# Patient Record
Sex: FEMALE | Race: WHITE | NOT HISPANIC OR LATINO | URBAN - METROPOLITAN AREA
[De-identification: names, ages, dates, MRNs, and addresses within clinical notes are randomized per-mention and may not be internally consistent; named-entity substitution may affect disease eponyms.]

---

## 2021-03-03 DIAGNOSIS — Z23 NEED FOR VACCINATION: ICD-10-CM

## 2022-12-20 ENCOUNTER — TELEPHONE (OUTPATIENT)
Dept: HEALTH INFORMATION MANAGEMENT | Facility: OTHER | Age: 67
End: 2022-12-20

## 2023-01-12 ENCOUNTER — TELEPHONE (OUTPATIENT)
Dept: HEALTH INFORMATION MANAGEMENT | Facility: OTHER | Age: 68
End: 2023-01-12

## 2023-09-27 ENCOUNTER — TELEPHONE (OUTPATIENT)
Dept: HEALTH INFORMATION MANAGEMENT | Facility: OTHER | Age: 68
End: 2023-09-27

## 2024-11-13 ENCOUNTER — APPOINTMENT (OUTPATIENT)
Dept: FAMILY PLANNING/WOMEN'S HEALTH CLINIC | Facility: PHYSICIAN GROUP | Age: 69
End: 2024-11-13
Payer: MEDICARE

## 2024-12-19 ENCOUNTER — TELEPHONE (OUTPATIENT)
Dept: HEALTH INFORMATION MANAGEMENT | Facility: OTHER | Age: 69
End: 2024-12-19
Payer: MEDICARE

## 2025-01-16 ENCOUNTER — APPOINTMENT (OUTPATIENT)
Dept: MEDICAL GROUP | Facility: IMAGING CENTER | Age: 70
End: 2025-01-16
Payer: MEDICARE

## 2025-01-16 VITALS
OXYGEN SATURATION: 90 % | BODY MASS INDEX: 22.38 KG/M2 | HEART RATE: 69 BPM | HEIGHT: 62 IN | WEIGHT: 121.6 LBS | DIASTOLIC BLOOD PRESSURE: 60 MMHG | SYSTOLIC BLOOD PRESSURE: 122 MMHG | RESPIRATION RATE: 16 BRPM | TEMPERATURE: 96.9 F

## 2025-01-16 DIAGNOSIS — Z86.79 HISTORY OF MITRAL VALVE PROLAPSE: ICD-10-CM

## 2025-01-16 DIAGNOSIS — Z76.89 ENCOUNTER TO ESTABLISH CARE: ICD-10-CM

## 2025-01-16 DIAGNOSIS — H02.9 EYELID PROBLEM: ICD-10-CM

## 2025-01-16 DIAGNOSIS — Z13.1 DIABETES MELLITUS SCREENING: ICD-10-CM

## 2025-01-16 DIAGNOSIS — E78.5 DYSLIPIDEMIA: ICD-10-CM

## 2025-01-16 DIAGNOSIS — Z12.31 ENCOUNTER FOR SCREENING MAMMOGRAM FOR BREAST CANCER: ICD-10-CM

## 2025-01-16 DIAGNOSIS — E55.9 VITAMIN D INSUFFICIENCY: ICD-10-CM

## 2025-01-16 DIAGNOSIS — Z78.0 MENOPAUSE: ICD-10-CM

## 2025-01-16 DIAGNOSIS — Z12.83 SKIN CANCER SCREENING: ICD-10-CM

## 2025-01-16 DIAGNOSIS — R76.8 ANA POSITIVE: ICD-10-CM

## 2025-01-16 DIAGNOSIS — Z11.59 NEED FOR HEPATITIS C SCREENING TEST: ICD-10-CM

## 2025-01-16 PROCEDURE — 3074F SYST BP LT 130 MM HG: CPT | Performed by: INTERNAL MEDICINE

## 2025-01-16 PROCEDURE — 1126F AMNT PAIN NOTED NONE PRSNT: CPT | Performed by: INTERNAL MEDICINE

## 2025-01-16 PROCEDURE — 99204 OFFICE O/P NEW MOD 45 MIN: CPT | Performed by: INTERNAL MEDICINE

## 2025-01-16 PROCEDURE — 3078F DIAST BP <80 MM HG: CPT | Performed by: INTERNAL MEDICINE

## 2025-01-16 ASSESSMENT — PATIENT HEALTH QUESTIONNAIRE - PHQ9: CLINICAL INTERPRETATION OF PHQ2 SCORE: 0

## 2025-01-16 ASSESSMENT — PAIN SCALES - GENERAL: PAINLEVEL_OUTOF10: NO PAIN

## 2025-01-16 NOTE — PROGRESS NOTES
New Patient to Establish    Reason to establish: New patient to establish    Martha Antonio is a 69 y.o. female who presents today with the following:    CC:   Chief Complaint   Patient presents with    [facility] Care     Comprehensive test       HPI:     Verbal consent was acquired by the patient to use [employer] ambient listening note generation during this visit Yes       History of Present Illness  The patient is a 69-year-old female presenting for establishing care, evaluation of eyelid erythema, elevated cholesterol, and health maintenance.    Eyelid Erythema  She contracted COVID-19 three years ago, characterized by dizziness upon awakening. Since then, she has had persistent eyelid redness despite abstaining from cosmetics for a month. No associated pain or vision changes. During COVID-19, she had string-like material in her eyes upon waking, causing occasional difficulty in opening her eyes. She has a history of poor vision and consults an ophthalmologist at [facility]. No history of allergies or other eye issues. No dry eyes or dry mouth.  - Onset: Persistent since myrna COVID-19 three years ago.  - Location: Eyelids.  - Duration: intermittent for three years.  - Character: Redness without pain or vision changes; string-like material in eyes during COVID-19.  - Alleviating/Aggravating Factors: Abstained from cosmetics for a month without improvement.  - Timing: Persistent redness; occasional difficulty in opening eyes during COVID-19.    Fair Skin  She has fair skin and does not regularly see a dermatologist as she has never had skin issues.    Elevated Cholesterol  Her cholesterol levels were significantly elevated four years ago. She maintains a diet rich in fruits, vegetables, and cheese but frequently consumes junk food. No supplements or joint pain.  - Onset: Elevated cholesterol levels noted four years ago.  - Character: Elevated cholesterol levels.  - Alleviating/Aggravating Factors: Diet rich  "in fruits, vegetables, and cheese; frequent consumption of junk food.    Health Maintenance  She has a history of mitral valve prolapse and was advised against the pneumonia vaccine by her cardiologist. She has received the COVID-19 vaccine. Pap smear results were normal. Completed Cologuard test. Interested in a bone density test due to her physically demanding job involving heavy lifting, though she has no pain. No chest pain or vaginal bleeding.    ALLERGIES  - No known allergies    IMMUNIZATIONS  - COVID-19 vaccine received    Problem   History of Mitral Valve Prolapse    Patient reported hx of Mitral valve prolapse  She denies chest pain, SOB, palpitations     Eyelid Problem    Intermittent conjunctival redness and eyelid problems     Dyslipidemia   Vaibhav Positive    Hx of positive VAIBHAV in 2005  Denies arthralgia, myagia  Recheck VAIBHAV           No current outpatient medications on file.    Allergies, past medical history, past surgical history, medications, family history, social history reviewed and updated.    ROS Please see HPI    Physical Exam  /60 (BP Location: Left arm, Patient Position: Sitting, BP Cuff Size: Adult)   Pulse 69   Temp 36.1 °C (96.9 °F) (Temporal)   Resp 16   Ht 1.575 m (5' 2\")   Wt 55.2 kg (121 lb 9.6 oz)   SpO2 90%   BMI 22.24 kg/m²   Physical Exam  Constitutional:       Appearance: Normal appearance.   HENT:      Head: Normocephalic and atraumatic.      Right Ear: External ear normal.      Left Ear: External ear normal.      Nose: Nose normal.      Mouth/Throat:      Pharynx: Oropharynx is clear.   Cardiovascular:      Rate and Rhythm: Normal rate and regular rhythm.      Pulses: Normal pulses.   Pulmonary:      Effort: Pulmonary effort is normal.      Breath sounds: Normal breath sounds.   Abdominal:      General: Bowel sounds are normal.      Palpations: Abdomen is soft.      Tenderness: There is no abdominal tenderness.   Musculoskeletal:      Cervical back: Neck supple.     "  Right lower leg: No edema.      Left lower leg: No edema.   Skin:     General: Skin is warm and dry.   Neurological:      Mental Status: She is alert. Mental status is at baseline.   Psychiatric:         Mood and Affect: Mood normal.         Behavior: Behavior normal.         Thought Content: Thought content normal.         Judgment: Judgment normal.         Assessment and Plan      Assessment & Plan      1. Encounter to establish care    2. Eyelid problem  - Referral to Ophthalmology  - CBC WITH DIFFERENTIAL; Future  Not infected     3. Dyslipidemia  - Lipid Profile; Future  - TSH WITH REFLEX TO FT4; Future  Healthful lifestyle measures    4. VAIBHAV positive  - CBC WITH DIFFERENTIAL; Future  - Comp Metabolic Panel; Future  - TSH WITH REFLEX TO FT4; Future  - URINALYSIS,CULTURE IF INDICATED; Future  - VAIBHAV ANTIBODY WITH REFLEX; Future    5. Skin cancer screening  - Referral to Dermatology    6. Diabetes mellitus screening  - Comp Metabolic Panel; Future    7. Need for hepatitis C screening test  - HEP C VIRUS ANTIBODY; Future    8. Vitamin D insufficiency  - VITAMIN D,25 HYDROXY (DEFICIENCY); Future  Recommend vitamin D3 6920-2861 international units daily    9. Menopause  - DS-BONE DENSITY STUDY (DEXA); Future    10. Encounter for screening mammogram for breast cancer  - MA-SCREENING MAMMO BILAT W/TOMOSYNTHESIS W/CAD; Future    11. History of mitral valve prolapse  Patient reported hx of Mitral valve prolapse  She denies chest pain, SOB, palpitations    Health maintenance - History of mitral valve prolapse, advised against pneumonia vaccine.  - Advised to take vitamin D3 supplements (0922-3707 units daily)  - Advised to receive Tdap, PCV20, Shingrix, influenza, and RSV vaccines at local pharmacy  - Advised annual mammogram and bone density test after age 65  - Comprehensive blood tests (cholesterol, sugar, liver, kidney function) and urine test ordered  - Orders for mammogram and bone density test provided  - VAIBHAV  levels to be rechecked    Follow-up:Return in about 6 weeks (around 2/27/2025).    This note was created using voice recognition software. There may be unintended errors in spelling, grammar or content.

## 2025-01-21 ENCOUNTER — HOSPITAL ENCOUNTER (OUTPATIENT)
Dept: LAB | Facility: MEDICAL CENTER | Age: 70
End: 2025-01-21
Attending: INTERNAL MEDICINE
Payer: MEDICARE

## 2025-01-21 DIAGNOSIS — Z13.1 DIABETES MELLITUS SCREENING: ICD-10-CM

## 2025-01-21 DIAGNOSIS — E55.9 VITAMIN D INSUFFICIENCY: ICD-10-CM

## 2025-01-21 DIAGNOSIS — H02.9 EYELID PROBLEM: ICD-10-CM

## 2025-01-21 DIAGNOSIS — E78.5 DYSLIPIDEMIA: ICD-10-CM

## 2025-01-21 DIAGNOSIS — R76.8 ANA POSITIVE: ICD-10-CM

## 2025-01-21 DIAGNOSIS — Z11.59 NEED FOR HEPATITIS C SCREENING TEST: ICD-10-CM

## 2025-01-21 LAB
APPEARANCE UR: CLEAR
BACTERIA #/AREA URNS HPF: NORMAL /HPF
BASOPHILS # BLD AUTO: 0.7 % (ref 0–1.8)
BASOPHILS # BLD: 0.05 K/UL (ref 0–0.12)
BILIRUB UR QL STRIP.AUTO: NEGATIVE
CASTS URNS QL MICRO: NORMAL /LPF (ref 0–2)
COLOR UR: YELLOW
EOSINOPHIL # BLD AUTO: 0.08 K/UL (ref 0–0.51)
EOSINOPHIL NFR BLD: 1.1 % (ref 0–6.9)
EPITHELIAL CELLS 1715: NORMAL /HPF (ref 0–5)
ERYTHROCYTE [DISTWIDTH] IN BLOOD BY AUTOMATED COUNT: 46 FL (ref 35.9–50)
GLUCOSE UR STRIP.AUTO-MCNC: NEGATIVE MG/DL
HCT VFR BLD AUTO: 41.8 % (ref 37–47)
HCV AB SER QL: NORMAL
HGB BLD-MCNC: 14.1 G/DL (ref 12–16)
IMM GRANULOCYTES # BLD AUTO: 0.02 K/UL (ref 0–0.11)
IMM GRANULOCYTES NFR BLD AUTO: 0.3 % (ref 0–0.9)
KETONES UR STRIP.AUTO-MCNC: NEGATIVE MG/DL
LEUKOCYTE ESTERASE UR QL STRIP.AUTO: ABNORMAL
LYMPHOCYTES # BLD AUTO: 1.24 K/UL (ref 1–4.8)
LYMPHOCYTES NFR BLD: 17 % (ref 22–41)
MCH RBC QN AUTO: 30.7 PG (ref 27–33)
MCHC RBC AUTO-ENTMCNC: 33.7 G/DL (ref 32.2–35.5)
MCV RBC AUTO: 90.9 FL (ref 81.4–97.8)
MICRO URNS: ABNORMAL
MONOCYTES # BLD AUTO: 0.57 K/UL (ref 0–0.85)
MONOCYTES NFR BLD AUTO: 7.8 % (ref 0–13.4)
NEUTROPHILS # BLD AUTO: 5.33 K/UL (ref 1.82–7.42)
NEUTROPHILS NFR BLD: 73.1 % (ref 44–72)
NITRITE UR QL STRIP.AUTO: NEGATIVE
NRBC # BLD AUTO: 0 K/UL
NRBC BLD-RTO: 0 /100 WBC (ref 0–0.2)
PH UR STRIP.AUTO: 6 [PH] (ref 5–8)
PLATELET # BLD AUTO: 273 K/UL (ref 164–446)
PMV BLD AUTO: 10.5 FL (ref 9–12.9)
PROT UR QL STRIP: NEGATIVE MG/DL
RBC # BLD AUTO: 4.6 M/UL (ref 4.2–5.4)
RBC # URNS HPF: NORMAL /HPF (ref 0–2)
RBC UR QL AUTO: NEGATIVE
SP GR UR STRIP.AUTO: 1.02
UROBILINOGEN UR STRIP.AUTO-MCNC: 1 EU/DL
WBC # BLD AUTO: 7.3 K/UL (ref 4.8–10.8)
WBC #/AREA URNS HPF: NORMAL /HPF

## 2025-01-21 PROCEDURE — 85025 COMPLETE CBC W/AUTO DIFF WBC: CPT

## 2025-01-21 PROCEDURE — 84439 ASSAY OF FREE THYROXINE: CPT

## 2025-01-21 PROCEDURE — 84443 ASSAY THYROID STIM HORMONE: CPT

## 2025-01-21 PROCEDURE — 80053 COMPREHEN METABOLIC PANEL: CPT

## 2025-01-21 PROCEDURE — 36415 COLL VENOUS BLD VENIPUNCTURE: CPT

## 2025-01-21 PROCEDURE — 81001 URINALYSIS AUTO W/SCOPE: CPT

## 2025-01-21 PROCEDURE — 86256 FLUORESCENT ANTIBODY TITER: CPT

## 2025-01-21 PROCEDURE — 82306 VITAMIN D 25 HYDROXY: CPT

## 2025-01-21 PROCEDURE — 86803 HEPATITIS C AB TEST: CPT

## 2025-01-21 PROCEDURE — 80061 LIPID PANEL: CPT

## 2025-01-21 PROCEDURE — 86039 ANTINUCLEAR ANTIBODIES (ANA): CPT

## 2025-01-21 PROCEDURE — 86038 ANTINUCLEAR ANTIBODIES: CPT

## 2025-01-21 PROCEDURE — 86235 NUCLEAR ANTIGEN ANTIBODY: CPT

## 2025-01-21 PROCEDURE — 86225 DNA ANTIBODY NATIVE: CPT

## 2025-01-22 LAB
25(OH)D3 SERPL-MCNC: 23 NG/ML (ref 30–100)
ALBUMIN SERPL BCP-MCNC: 5.1 G/DL (ref 3.2–4.9)
ALBUMIN/GLOB SERPL: 2 G/DL
ALP SERPL-CCNC: 74 U/L (ref 30–99)
ALT SERPL-CCNC: 17 U/L (ref 2–50)
ANION GAP SERPL CALC-SCNC: 13 MMOL/L (ref 7–16)
AST SERPL-CCNC: 21 U/L (ref 12–45)
BILIRUB SERPL-MCNC: 0.4 MG/DL (ref 0.1–1.5)
BUN SERPL-MCNC: 15 MG/DL (ref 8–22)
CALCIUM ALBUM COR SERPL-MCNC: 8.7 MG/DL (ref 8.5–10.5)
CALCIUM SERPL-MCNC: 9.6 MG/DL (ref 8.5–10.5)
CHLORIDE SERPL-SCNC: 101 MMOL/L (ref 96–112)
CHOLEST SERPL-MCNC: 287 MG/DL (ref 100–199)
CO2 SERPL-SCNC: 26 MMOL/L (ref 20–33)
CREAT SERPL-MCNC: 0.63 MG/DL (ref 0.5–1.4)
GFR SERPLBLD CREATININE-BSD FMLA CKD-EPI: 96 ML/MIN/1.73 M 2
GLOBULIN SER CALC-MCNC: 2.5 G/DL (ref 1.9–3.5)
GLUCOSE SERPL-MCNC: 81 MG/DL (ref 65–99)
HDLC SERPL-MCNC: 51 MG/DL
LDLC SERPL CALC-MCNC: 206 MG/DL
POTASSIUM SERPL-SCNC: 4.4 MMOL/L (ref 3.6–5.5)
PROT SERPL-MCNC: 7.6 G/DL (ref 6–8.2)
SODIUM SERPL-SCNC: 140 MMOL/L (ref 135–145)
T4 FREE SERPL-MCNC: 1.08 NG/DL (ref 0.93–1.7)
TRIGL SERPL-MCNC: 149 MG/DL (ref 0–149)
TSH SERPL DL<=0.005 MIU/L-ACNC: 5.36 UIU/ML (ref 0.38–5.33)

## 2025-01-23 LAB — NUCLEAR IGG SER QL IA: DETECTED

## 2025-01-24 LAB
ANA PAT SER IF-IMP: ABNORMAL
ANA PAT SER IF-IMP: ABNORMAL
NUCLEAR IGG SER QL IF: DETECTED
NUCLEAR IGG TITR SER IF: ABNORMAL {TITER}

## 2025-01-25 LAB — U1 SNRNP IGG SER QL: 4 UNITS (ref 0–19)

## 2025-01-26 LAB
DSDNA AB TITR SER CLIF: NORMAL {TITER}
ENA JO1 AB TITR SER: 0 AU/ML (ref 0–40)
ENA SCL70 IGG SER QL: 4 AU/ML (ref 0–40)
ENA SM IGG SER-ACNC: 8 AU/ML (ref 0–40)
ENA SS-A 60KD AB SER-ACNC: 0 AU/ML (ref 0–40)
ENA SS-A IGG SER QL: 2 AU/ML (ref 0–40)
ENA SS-B IGG SER IA-ACNC: 0 AU/ML (ref 0–40)

## 2025-01-27 LAB — DSDNA IGG TITR SER CLIF: NORMAL {TITER}

## 2025-01-28 ENCOUNTER — HOSPITAL ENCOUNTER (OUTPATIENT)
Dept: RADIOLOGY | Facility: MEDICAL CENTER | Age: 70
End: 2025-01-28
Attending: INTERNAL MEDICINE
Payer: MEDICARE

## 2025-01-28 DIAGNOSIS — Z12.31 ENCOUNTER FOR SCREENING MAMMOGRAM FOR BREAST CANCER: ICD-10-CM

## 2025-01-28 PROCEDURE — 77067 SCR MAMMO BI INCL CAD: CPT

## 2025-02-19 ENCOUNTER — HOSPITAL ENCOUNTER (OUTPATIENT)
Dept: RADIOLOGY | Facility: MEDICAL CENTER | Age: 70
End: 2025-02-19
Attending: INTERNAL MEDICINE
Payer: MEDICARE

## 2025-02-19 ENCOUNTER — RESULTS FOLLOW-UP (OUTPATIENT)
Dept: MEDICAL GROUP | Facility: IMAGING CENTER | Age: 70
End: 2025-02-19

## 2025-02-19 DIAGNOSIS — R92.8 ABNORMAL MAMMOGRAM: ICD-10-CM

## 2025-02-19 PROCEDURE — 76642 ULTRASOUND BREAST LIMITED: CPT | Mod: LT,RT

## 2025-02-25 ENCOUNTER — OFFICE VISIT (OUTPATIENT)
Dept: MEDICAL GROUP | Facility: IMAGING CENTER | Age: 70
End: 2025-02-25
Payer: MEDICARE

## 2025-02-25 VITALS
OXYGEN SATURATION: 100 % | TEMPERATURE: 96.9 F | HEIGHT: 62 IN | SYSTOLIC BLOOD PRESSURE: 120 MMHG | WEIGHT: 123.8 LBS | HEART RATE: 74 BPM | DIASTOLIC BLOOD PRESSURE: 78 MMHG | RESPIRATION RATE: 16 BRPM | BODY MASS INDEX: 22.78 KG/M2

## 2025-02-25 DIAGNOSIS — R76.8 ANTICENTROMERE ANTIBODIES PRESENT: ICD-10-CM

## 2025-02-25 DIAGNOSIS — E78.5 DYSLIPIDEMIA: ICD-10-CM

## 2025-02-25 DIAGNOSIS — E03.8 SUBCLINICAL HYPOTHYROIDISM: ICD-10-CM

## 2025-02-25 PROCEDURE — 3078F DIAST BP <80 MM HG: CPT | Performed by: INTERNAL MEDICINE

## 2025-02-25 PROCEDURE — 3074F SYST BP LT 130 MM HG: CPT | Performed by: INTERNAL MEDICINE

## 2025-02-25 PROCEDURE — 99214 OFFICE O/P EST MOD 30 MIN: CPT | Performed by: INTERNAL MEDICINE

## 2025-02-25 RX ORDER — ROSUVASTATIN CALCIUM 5 MG/1
5 TABLET, COATED ORAL EVERY EVENING
Qty: 100 TABLET | Refills: 3 | Status: SHIPPED | OUTPATIENT
Start: 2025-02-25 | End: 2026-04-01

## 2025-02-25 ASSESSMENT — FIBROSIS 4 INDEX: FIB4 SCORE: 1.29

## 2025-02-25 NOTE — ASSESSMENT & PLAN NOTE
No signs or symptoms of scleroderma, sclerodactyly, denies dysphagia, no significant telangiectasia    Finger change color in cold environment which she is able to compensate with keeping hands and fingers warm    We discussed about rheumatology E consult. She would like to defer and monitor for now.

## 2025-02-25 NOTE — PROGRESS NOTES
Established Patient    Martha Antonio is a 69 y.o. female who presents today with the following:    CC:   Chief Complaint   Patient presents with    Follow-Up     Recent imaging and labs       HPI:     Verbal consent was acquired by the patient to use [employer] ambient listening note generation during this visit Yes     History of Present Illness  The patient presents for evaluation of hypercholesterolemia, vitamin D deficiency, and an autoimmune disorder.    Hypercholesterolemia  She attributes her elevated cholesterol to a diet high in junk food and prefers dietary modifications over medication unless necessary. She has gained 20 pounds since senior care despite an active lifestyle. She has a history of hypercholesterolemia since youth and consumes alcohol infrequently. No history of myalgia.  - Onset: Since youth.  - Character: Elevated cholesterol.  - Alleviating/Aggravating Factors: Prefers dietary modifications over medication; diet high in junk food.  - Severity: Gained 20 pounds since senior care.    Vitamin D Deficiency  Currently on 2000 IU vitamin D supplements.    Autoimmune Disorder  Positive Anticentromere Ab No dysphagia or calcinosis. Advised to keep hands warm and wears gloves during morning work.    No signs or symptoms of scleroderma, sclerodactyly, denies dysphagia, no significant telangiectasia    Finger change color in cold environment which she is able to compensate with keeping hands and fingers warm    We discussed about rheumatology E consult. She would like to defer and monitor for now.    Additional Information  Colonoscopy was normal. Mammogram required follow-up due to lack of previous records, but sonogram was normal.    SOCIAL HISTORY  - Non-smoker  - Drinks alcohol infrequently, about five times a year    FAMILY HISTORY  - Mother  following a car accident    MEDICATIONS  - Vitamin D: 2000 IU daily        Problem   Anticentromere Antibodies Present    No signs or symptoms of  "scleroderma, sclerodactyly, denies dysphagia, no significant telangiectasia    Finger change color in cold environment which she is able to compensate with keeping hands and fingers warm    We discussed about rheumatology E consult. She would like to defer and monitor for now.          Current Outpatient Medications   Medication Sig    rosuvastatin (CRESTOR) 5 MG Tab Take 1 Tablet by mouth every evening.     Allergies   Allergen Reactions    Azithromycin        Allergies, past medical history, past surgical history, medications, family history, social history reviewed and updated.    ROS Please see HPI    Physical Exam  Vitals: /78 (BP Location: Left arm, Patient Position: Sitting, BP Cuff Size: Adult)   Pulse 74   Temp 36.1 °C (96.9 °F) (Temporal)   Resp 16   Ht 1.575 m (5' 2\")   Wt 56.2 kg (123 lb 12.8 oz)   SpO2 100%   BMI 22.64 kg/m²   Physical Exam  Constitutional:       Appearance: Normal appearance.   HENT:      Head: Normocephalic and atraumatic.      Right Ear: External ear normal.      Left Ear: External ear normal.      Nose: Nose normal.      Mouth/Throat:      Pharynx: Oropharynx is clear.   Cardiovascular:      Rate and Rhythm: Normal rate and regular rhythm.      Pulses: Normal pulses.   Pulmonary:      Effort: Pulmonary effort is normal.      Breath sounds: Normal breath sounds.   Abdominal:      General: Bowel sounds are normal.      Palpations: Abdomen is soft.      Tenderness: There is no abdominal tenderness.   Musculoskeletal:      Cervical back: Neck supple.      Right lower leg: No edema.      Left lower leg: No edema.   Skin:     General: Skin is warm and dry.   Neurological:      Mental Status: She is alert. Mental status is at baseline.   Psychiatric:         Mood and Affect: Mood normal.         Behavior: Behavior normal.         Thought Content: Thought content normal.         Judgment: Judgment normal.         Labs (1/21/25) were reviewed and discussed with patients.  All " "questions were answered.      Assessment and Plan    Assessment & Plan      1. Dyslipidemia  - Lipid Profile; Future  - Lipoprotein (a); Future  - Advised dietary changes, including increasing omega-3 rich foods such as salmon and walnuts, and avoiding high-fat foods like shepard, butter, and pastries.  - Recommended regular physical activity and staying active.  - Prescribed rosuvastatin with instructions to discontinue if myalgia occurs.  - Suggested a calcium cardiac scoring CT scan to assess coronary artery plaque, but patient declined due to cost.    - rosuvastatin (CRESTOR) 5 MG Tab; Take 1 Tablet by mouth every evening.  Dispense: 100 Tablet; Refill: 3    Benefit and risks of statin discussed with patient include side effect such as muscle toxicity, patient was told if having side effects from medication, to stop the medication and notify healthcare provider.  Patient is agreeable with healthful diet, exercise and initiation of statin.      2. Subclinical hypothyroidism  - Comp Metabolic Panel; Future  - TSH WITH REFLEX TO FT4; Future  - THYROID PEROXIDASE  (TPO) AB; Future  Monitor    3. Anticentromere antibodies present  No signs or symptoms of scleroderma, sclerodactyly, denies dysphagia, no significant telangiectasia  Finger change color in cold environment which she is able to compensate with keeping hands and fingers warm  We discussed about rheumatology E consult. She would like to defer and monitor for now.    Lifestyle Changes for Improving Cholesterol Levels    To Decrease Triglycerides:    - Increase Omega-3 Intake: Consume more fish (e.g., salmon), olive oil, and consider taking fish oil supplements (1 gram/day).  - Decrease Omega-6 Intake: Avoid grain-fed products like chicken and beef; opt for range-free and grass-fed alternatives.  To Increase HDL (\"Good\" Cholesterol):    Exercise: Aim for 30 minutes of physical activity daily. It can be spread throughout the day.    To Decrease LDL (\"Bad\" " "Cholesterol):    - Limit Saturated Fats: Avoid foods rich in saturated fats (lard, butter, shepard, coconut oil, palm oil found in pastries).  - Follow Balanced Portions: Use \"ACP healthy plate\" guidelines to limit cholesterol intake and avoid high-cholesterol foods.  - Include Omega-3 Rich Foods: Add salmon, flaxseed powder, and walnuts to your diet for improved cholesterol balance.    General Cholesterol Management    Dietary Adjustments:    - Reduce Saturated Fats: Limit red meat, full-fat dairy, and fried foods.  - Increase Soluble Fiber: Eat more oats, beans, lentils, apples, and pears to lower LDL levels.  - Add Healthy Fats: Focus on unsaturated fats from olive oil, avocados, and nuts, as well as Omega-3 sources like fish and flaxseeds.  - Cut Down on Trans Fats: Avoid hydrogenated oils and processed foods.    Physical Activity:    Exercise Regularly: Aim for 150 minutes of moderate aerobic exercise or 75 minutes of vigorous activity per week to boost HDL and lower LDL.    Weight Management:    - Lose Excess Weight: Even a modest reduction in weight (5-10%) can lower LDL cholesterol.    Quit Smoking:  - Smoking Cessation: Quitting smoking improves HDL levels and lowers heart disease risk.    Limit Alcohol:  - Moderate Alcohol Intake: Limit alcohol to one drink or less per day, as excessive consumption raises LDL and triglycerides.      Follow-up:Return in about 3 months (around 5/25/2025), or if symptoms worsen or fail to improve.    This note was created using voice recognition software. There may be unintended errors in spelling, grammar or content.      "

## 2025-03-07 ENCOUNTER — HOSPITAL ENCOUNTER (OUTPATIENT)
Dept: RADIOLOGY | Facility: MEDICAL CENTER | Age: 70
End: 2025-03-07
Attending: INTERNAL MEDICINE
Payer: MEDICARE

## 2025-03-07 DIAGNOSIS — Z78.0 MENOPAUSE: ICD-10-CM

## 2025-03-07 PROCEDURE — 77080 DXA BONE DENSITY AXIAL: CPT

## 2025-03-10 ENCOUNTER — RESULTS FOLLOW-UP (OUTPATIENT)
Dept: MEDICAL GROUP | Facility: IMAGING CENTER | Age: 70
End: 2025-03-10
Payer: MEDICARE

## 2025-03-21 ENCOUNTER — APPOINTMENT (OUTPATIENT)
Dept: MEDICAL GROUP | Facility: IMAGING CENTER | Age: 70
End: 2025-03-21
Payer: MEDICARE

## 2025-05-19 ENCOUNTER — OFFICE VISIT (OUTPATIENT)
Dept: MEDICAL GROUP | Facility: IMAGING CENTER | Age: 70
End: 2025-05-19
Payer: MEDICARE

## 2025-05-19 VITALS
WEIGHT: 122 LBS | HEART RATE: 88 BPM | DIASTOLIC BLOOD PRESSURE: 70 MMHG | TEMPERATURE: 97.7 F | HEIGHT: 62 IN | RESPIRATION RATE: 16 BRPM | SYSTOLIC BLOOD PRESSURE: 108 MMHG | BODY MASS INDEX: 22.45 KG/M2 | OXYGEN SATURATION: 98 %
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
## 2025-05-19 PROCEDURE — 99213 OFFICE O/P EST LOW 20 MIN: CPT

## 2025-05-19 PROCEDURE — 3074F SYST BP LT 130 MM HG: CPT

## 2025-05-19 PROCEDURE — 1126F AMNT PAIN NOTED NONE PRSNT: CPT

## 2025-05-19 PROCEDURE — 3078F DIAST BP <80 MM HG: CPT

## 2025-05-19 RX ORDER — BACITRACIN ZINC AND POLYMYXIN B SULFATE 500; 1000 [USP'U]/G; [USP'U]/G
1 OINTMENT TOPICAL 2 TIMES DAILY
Qty: 15 G | Refills: 0 | Status: SHIPPED | OUTPATIENT
Start: 2025-05-19

## 2025-05-19 ASSESSMENT — FIBROSIS 4 INDEX: FIB4 SCORE: 1.29

## 2025-05-19 ASSESSMENT — PAIN SCALES - GENERAL: PAINLEVEL_OUTOF10: NO PAIN

## 2025-05-19 NOTE — PROGRESS NOTES
"Subjective:     CC:   Chief Complaint   Patient presents with    Spider Bite     One month, was in a garden the day before, on left side of abdomen area, has not healed at all, skin is grey, sore to the touch, swelling has stopped.        HPI:   Martha presents today to discuss:    Bug bite  Onset of symptoms started over 3 weeks ago.  Patient reports that she was gardening and noticed a red bump with \"white tip\" on her left lower abdomen at the end of the day. She was not able to identify wether it was an insect, bug, or spider that bit her.   She attempted to remove the \"white tip\" without success and caused more irritation. The area became more swollen and sore to touch the following days. She noticed the area where the \"white tip\" was turned gray.   The area is improving; however, has not fully healed. Denies fever, pruritus, severe redness, swelling, pain, drainage, and warmth to area.      Past Medical History[1]  Family History   Problem Relation Age of Onset    No Known Problems Mother     No Known Problems Father     No Known Problems Sister      Past Surgical History[2]  Social History[3]  Social History     Social History Narrative    Not on file     Current Medications and Prescriptions Ordered in Epic[4]  Azithromycin    ROS: see hpi  Gen: no fevers/chills  Pulm: no sob, no cough  CV: no chest pain, no palpitations, no edema  GI: no nausea/vomiting, no diarrhea  Skin: bug bite    Objective:   Exam:  /70 (BP Location: Right arm, Patient Position: Sitting, BP Cuff Size: Adult)   Pulse 88   Temp 36.5 °C (97.7 °F) (Temporal)   Resp 16   Ht 1.575 m (5' 2\")   Wt 55.3 kg (122 lb)   SpO2 98%   BMI 22.31 kg/m²    Body mass index is 22.31 kg/m².    Gen: Alert and oriented, No apparent distress.  HEENT: Head atraumatic, normocephalic. Pupils equal and round.  Neck: Neck is supple without lymphadenopathy.   Lungs: Normal effort, CTA bilaterally, no wheezes, rhonchi, or rales  CV: Regular rate and " rhythm. No murmurs, rubs, or gallops.  ABD: +BS. Non-tender, non-distended. No rebound, rigidity, or guarding.  Ext: No clubbing, cyanosis, edema.  Skin:  Small erosion and crusted areas. no edema, drainage or warmth  Assessment & Plan:     69 y.o. female with the following -     1. Bug bite, initial encounter (Primary)  New issue.  Onset of symptoms occurred over 3 weeks ago.  Patient presentation is less likely to be a true spider bite and is more consistent with a healing excoriated insect bite or folliculitis.  No signs or symptoms cellulitis.   Recommend:  -Keep area clean and dry  -Apply topical antibiotic ointment-prescription sent to pharmacy  -Avoid further picking or trauma to area  Watch for signs and symptoms of infection that include increased redness, warmth, drainage, pain, fever.  If these occurs, please follow-up in office.    - bacitracin-polymyxin b (POLYSPORIN) 500-27516 UNIT/GM Ointment; Apply 1 Each topically 2 times a day.  Dispense: 15 g; Refill: 0      Return if symptoms worsen or fail to improve.    TREVON Fleming   [facility]    Medical Decision Making/Course:  In the course of preparing for this visit with review of the pertinent past medical history, recent and past clinic visits, current medications, and performing chart, immunization, medical history and medication reconciliation, and in the further course of obtaining the current history pertinent to the clinic visit today, performing an exam and evaluation, ordering and independently evaluating labs, tests, and/or procedures, prescribing any recommended new medications as noted above, providing any pertinent counseling and education and recommending further coordination of care. This was discussed with patient in a shared-decision making conversation, and they understand and agreed with plan of care.     Please note that this dictation was created using voice recognition software. I have made every reasonable  attempt to correct obvious errors, but I expect that there are errors of grammar and possibly content that I did not discover before finalizing the note.         [1] History reviewed. No pertinent past medical history.  [2] History reviewed. No pertinent surgical history.  [3]   Social History  Tobacco Use    Smoking status: Never    Smokeless tobacco: Never   Vaping Use    Vaping status: Never Used   Substance Use Topics    Alcohol use: Not Currently     Comment: rarely    Drug use: No   [4]   Current Outpatient Medications Ordered in Epic   Medication Sig Dispense Refill    bacitracin-polymyxin b (POLYSPORIN) [id number] UNIT/GM Ointment Apply 1 Each topically 2 times a day. 15 g 0    rosuvastatin (CRESTOR) 5 MG Tab Take 1 Tablet by mouth every evening. (Patient not taking: Reported on 5/19/2025) 100 Tablet 3     No current Epic-ordered facility-administered medications on file.

## 2025-05-27 ENCOUNTER — OFFICE VISIT (OUTPATIENT)
Dept: MEDICAL GROUP | Facility: IMAGING CENTER | Age: 70
End: 2025-05-27
Payer: MEDICARE

## 2025-05-27 ENCOUNTER — HOSPITAL ENCOUNTER (OUTPATIENT)
Dept: LAB | Facility: MEDICAL CENTER | Age: 70
End: 2025-05-27
Attending: INTERNAL MEDICINE
Payer: MEDICARE

## 2025-05-27 ENCOUNTER — APPOINTMENT (OUTPATIENT)
Dept: LAB | Facility: MEDICAL CENTER | Age: 70
End: 2025-05-27
Payer: MEDICARE

## 2025-05-27 VITALS
SYSTOLIC BLOOD PRESSURE: 136 MMHG | WEIGHT: 121 LBS | OXYGEN SATURATION: 98 % | RESPIRATION RATE: 16 BRPM | TEMPERATURE: 96.9 F | HEART RATE: 74 BPM | DIASTOLIC BLOOD PRESSURE: 72 MMHG | BODY MASS INDEX: 22.26 KG/M2 | HEIGHT: 62 IN

## 2025-05-27 DIAGNOSIS — E03.8 SUBCLINICAL HYPOTHYROIDISM: ICD-10-CM

## 2025-05-27 DIAGNOSIS — E78.5 DYSLIPIDEMIA: Primary | ICD-10-CM

## 2025-05-27 DIAGNOSIS — R21 RASH: ICD-10-CM

## 2025-05-27 DIAGNOSIS — R76.8 ANTICENTROMERE ANTIBODIES PRESENT: ICD-10-CM

## 2025-05-27 DIAGNOSIS — E78.5 DYSLIPIDEMIA: ICD-10-CM

## 2025-05-27 DIAGNOSIS — Z86.79 HISTORY OF MITRAL VALVE PROLAPSE: ICD-10-CM

## 2025-05-27 LAB
ALBUMIN SERPL BCP-MCNC: 4.5 G/DL (ref 3.2–4.9)
ALBUMIN/GLOB SERPL: 1.4 G/DL
ALP SERPL-CCNC: 76 U/L (ref 30–99)
ALT SERPL-CCNC: 18 U/L (ref 2–50)
ANION GAP SERPL CALC-SCNC: 12 MMOL/L (ref 7–16)
AST SERPL-CCNC: 21 U/L (ref 12–45)
BILIRUB SERPL-MCNC: 0.4 MG/DL (ref 0.1–1.5)
BUN SERPL-MCNC: 18 MG/DL (ref 8–22)
CALCIUM ALBUM COR SERPL-MCNC: 9.8 MG/DL (ref 8.5–10.5)
CALCIUM SERPL-MCNC: 10.2 MG/DL (ref 8.5–10.5)
CHLORIDE SERPL-SCNC: 108 MMOL/L (ref 96–112)
CHOLEST SERPL-MCNC: 276 MG/DL (ref 100–199)
CO2 SERPL-SCNC: 26 MMOL/L (ref 20–33)
CREAT SERPL-MCNC: 0.92 MG/DL (ref 0.5–1.4)
FASTING STATUS PATIENT QL REPORTED: NORMAL
GFR SERPLBLD CREATININE-BSD FMLA CKD-EPI: 67 ML/MIN/1.73 M 2
GLOBULIN SER CALC-MCNC: 3.3 G/DL (ref 1.9–3.5)
GLUCOSE SERPL-MCNC: 78 MG/DL (ref 65–99)
HDLC SERPL-MCNC: 56 MG/DL
LDLC SERPL CALC-MCNC: 192 MG/DL
POTASSIUM SERPL-SCNC: 4.3 MMOL/L (ref 3.6–5.5)
PROT SERPL-MCNC: 7.8 G/DL (ref 6–8.2)
SODIUM SERPL-SCNC: 146 MMOL/L (ref 135–145)
THYROPEROXIDASE AB SERPL-ACNC: 10.2 IU/ML (ref 0–9)
TRIGL SERPL-MCNC: 138 MG/DL (ref 0–149)
TSH SERPL DL<=0.005 MIU/L-ACNC: 3.56 UIU/ML (ref 0.38–5.33)

## 2025-05-27 PROCEDURE — 80061 LIPID PANEL: CPT

## 2025-05-27 PROCEDURE — 3078F DIAST BP <80 MM HG: CPT | Performed by: INTERNAL MEDICINE

## 2025-05-27 PROCEDURE — 3075F SYST BP GE 130 - 139MM HG: CPT | Performed by: INTERNAL MEDICINE

## 2025-05-27 PROCEDURE — 86376 MICROSOMAL ANTIBODY EACH: CPT

## 2025-05-27 PROCEDURE — 36415 COLL VENOUS BLD VENIPUNCTURE: CPT

## 2025-05-27 PROCEDURE — 99214 OFFICE O/P EST MOD 30 MIN: CPT | Performed by: INTERNAL MEDICINE

## 2025-05-27 PROCEDURE — 83695 ASSAY OF LIPOPROTEIN(A): CPT

## 2025-05-27 PROCEDURE — 84443 ASSAY THYROID STIM HORMONE: CPT

## 2025-05-27 PROCEDURE — 80053 COMPREHEN METABOLIC PANEL: CPT

## 2025-05-27 ASSESSMENT — FIBROSIS 4 INDEX: FIB4 SCORE: 1.29

## 2025-05-27 NOTE — PROGRESS NOTES
Established Patient    Martha Antonio is a 69 y.o. female who presents today with the following:    CC:   Chief Complaint   Patient presents with    Follow-Up     Pt report just getting labs this morning       HPI:     Verbal consent was acquired by the patient to use [employer] ambient listening note generation during this visit Yes     History of Present Illness  The patient presents for evaluation of a bug bite, hypercholesterolemia, and health maintenance.    Bug Bite  Persistent soreness from a bug bite 6 weeks ago. Initial severe pain with significant enlargement and gray skin. Current pain level 1/10. Advised to see a dermatologist.  - Onset: 6 weeks ago.  - Location: Site of bug bite.  - Duration: Persistent soreness for 6 weeks.  - Character: Initial severe pain, significant enlargement, gray skin.  - Severity: Current pain level 1/10.    Hypercholesterolemia  Significant dietary modifications: reduced Coke intake from 2-3 daily to 1 per week, increased fruits and vegetables, eliminated takeout food. No decrease in cholesterol levels. Considering cardiologist consultation and statin therapy. Mother had high cholesterol and  of a heart attack at 82.  - Alleviating/Aggravating Factors: Dietary modifications (reduced Coke intake, increased fruits and vegetables, eliminated takeout food).  - Severity: No decrease in cholesterol levels.    Health Maintenance  Supplementing with vitamin D gummies. Concerns about pneumonia vaccine due to mitral valve prolapse but considering it.    SOCIAL HISTORY  - Drinks one Coke a week    FAMILY HISTORY  - Mother had high cholesterol and  of a heart attack at age 82        Current Outpatient Medications   Medication Sig    bacitracin-polymyxin b (POLYSPORIN) [id number] UNIT/GM Ointment Apply 1 Each topically 2 times a day.    rosuvastatin (CRESTOR) 5 MG Tab Take 1 Tablet by mouth every evening. (Patient not taking: Reported on 2025)  "    Allergies[1]    Allergies, past medical history, past surgical history, medications, family history, social history reviewed and updated.    ROS Please see HPI    Physical Exam  Vitals: /72 (BP Location: Left arm, Patient Position: Sitting, BP Cuff Size: Adult)   Pulse 74   Temp 36.1 °C (96.9 °F) (Temporal)   Resp 16   Ht 1.575 m (5' 2\")   Wt 54.9 kg (121 lb)   SpO2 98%   BMI 22.13 kg/m²   Physical Exam  Constitutional:       Appearance: Normal appearance.   HENT:      Head: Normocephalic and atraumatic.      Right Ear: External ear normal.      Left Ear: External ear normal.      Nose: Nose normal.      Mouth/Throat:      Pharynx: Oropharynx is clear.   Cardiovascular:      Rate and Rhythm: Normal rate and regular rhythm.      Pulses: Normal pulses.   Pulmonary:      Effort: Pulmonary effort is normal.      Breath sounds: Normal breath sounds.   Abdominal:      General: Bowel sounds are normal.      Palpations: Abdomen is soft.      Tenderness: There is no abdominal tenderness.   Musculoskeletal:      Cervical back: Neck supple.      Right lower leg: No edema.      Left lower leg: No edema.      Comments: Red rash on left lower quadrant abdomen   Skin:     General: Skin is warm and dry.   Neurological:      Mental Status: She is alert. Mental status is at baseline.   Psychiatric:         Mood and Affect: Mood normal.         Behavior: Behavior normal.         Thought Content: Thought content normal.         Judgment: Judgment normal.           Assessment and Plan    Assessment & Plan      1. Rash  Red rash on left lower quadrant abdomen   Possible bug bite  Using OTC POLYSPORIN   Not healed yet  Recommend patient to schedule an appointment with [facility]  Contact info provided    2. Dyslipidemia  Healthful lifestyle measures  She has not started on statin yet  Pending follow up lab result  CT cardiac score not affordable to her  She is open to see vascular medicine or cardiology if her " lipid profile remains uncontrolled with lifestyle modifications    3. Anticentromere antibodies present  No signs or symptoms of scleroderma, sclerodactyly, denies dysphagia, no significant telangiectasia  Finger change color in cold environment which she is able to compensate with keeping hands and fingers warm  We discussed about rheumatology referral She would like to defer and monitor for now.    4. History of mitral valve prolapse  Asymptomatic monitor    Health maintenance.  - Normal bone density results.  - Benign breast cysts from last mammogram.  - Due for pneumonia (PCV 20 or 21) and shingles vaccines; will receive at pharmacy.  - Follow-up mammogram next year.    Follow-up:Return in about 2 weeks (around 6/10/2025).    This note was created using voice recognition software. There may be unintended errors in spelling, grammar or content.           [1]   Allergies  Allergen Reactions    Azithromycin

## 2025-05-29 ENCOUNTER — RESULTS FOLLOW-UP (OUTPATIENT)
Dept: MEDICAL GROUP | Facility: IMAGING CENTER | Age: 70
End: 2025-05-29
Payer: MEDICARE

## 2025-05-29 LAB — LPA SERPL-MCNC: 130 MG/DL

## 2025-06-03 ENCOUNTER — APPOINTMENT (OUTPATIENT)
Dept: MEDICAL GROUP | Facility: IMAGING CENTER | Age: 70
End: 2025-06-03
Payer: MEDICARE

## 2025-06-03 VITALS
BODY MASS INDEX: 22.12 KG/M2 | WEIGHT: 120.2 LBS | HEART RATE: 78 BPM | HEIGHT: 62 IN | TEMPERATURE: 97.6 F | OXYGEN SATURATION: 98 % | SYSTOLIC BLOOD PRESSURE: 118 MMHG | RESPIRATION RATE: 16 BRPM | DIASTOLIC BLOOD PRESSURE: 70 MMHG

## 2025-06-03 DIAGNOSIS — Z51.81 MEDICATION MONITORING ENCOUNTER: ICD-10-CM

## 2025-06-03 DIAGNOSIS — E78.41 ELEVATED LIPOPROTEIN(A): ICD-10-CM

## 2025-06-03 DIAGNOSIS — E78.5 DYSLIPIDEMIA: ICD-10-CM

## 2025-06-03 DIAGNOSIS — Z00.00 MEDICARE ANNUAL WELLNESS VISIT, SUBSEQUENT: ICD-10-CM

## 2025-06-03 DIAGNOSIS — E55.9 VITAMIN D INSUFFICIENCY: ICD-10-CM

## 2025-06-03 DIAGNOSIS — R79.89 ABNORMAL CBC: ICD-10-CM

## 2025-06-03 PROCEDURE — 3074F SYST BP LT 130 MM HG: CPT | Performed by: INTERNAL MEDICINE

## 2025-06-03 PROCEDURE — 3078F DIAST BP <80 MM HG: CPT | Performed by: INTERNAL MEDICINE

## 2025-06-03 PROCEDURE — G0438 PPPS, INITIAL VISIT: HCPCS | Performed by: INTERNAL MEDICINE

## 2025-06-03 RX ORDER — ROSUVASTATIN CALCIUM 20 MG/1
20 TABLET, COATED ORAL EVERY EVENING
Qty: 100 TABLET | Refills: 3 | Status: SHIPPED | OUTPATIENT
Start: 2025-06-03 | End: 2026-07-08

## 2025-06-03 ASSESSMENT — ACTIVITIES OF DAILY LIVING (ADL): BATHING_REQUIRES_ASSISTANCE: 0

## 2025-06-03 ASSESSMENT — FIBROSIS 4 INDEX: FIB4 SCORE: 1.25

## 2025-06-03 ASSESSMENT — PATIENT HEALTH QUESTIONNAIRE - PHQ9: CLINICAL INTERPRETATION OF PHQ2 SCORE: 0

## 2025-06-03 ASSESSMENT — ENCOUNTER SYMPTOMS: GENERAL WELL-BEING: GOOD

## 2025-06-03 NOTE — PROGRESS NOTES
Chief Complaint   Patient presents with   • Annual Wellness Visit       HPI:  Martha Antonio is a 69 y.o. 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     Allergies: Azithromycin    Current social contact/activities:      She  reports that she has never smoked. She has never used smokeless tobacco. She reports that she does not currently use alcohol. She reports that she does not use drugs.  Counseling given: Not Answered      ROS:    Gait: Uses no assistive device  Ostomy: No  Other tubes: No  Amputations: No  Chronic oxygen use: No  Last eye exam: Recently  Wears hearing aids: No   : Denies any urinary leakage during the last 6 months    Screening:    Depression Screening  Little interest or pleasure in doing things?  0 - not at all  Feeling down, depressed , or hopeless? 0 - not at all  Patient Health Questionnaire Score: 0     If depressive symptoms identified deferred to follow up visit unless specifically addressed in assessment and plan.    Interpretation of PHQ-9 Total Score   Score Severity   1-4 No Depression   5-9 Mild Depression   10-14 Moderate Depression   15-19 Moderately Severe Depression   20-27 Severe Depression    Screening for Cognitive Impairment  Do you or any of your friends or family members have any concern about your memory? No  Three Minute Recall (Village, Kitchen, Baby) 3/3    Anthony clock face with all 12 numbers and set the hands to show 10 minutes past 11.  Yes 5  Cognitive concerns identified deferred for follow up unless specifically addressed in assessment and plan.    Fall Risk Assessment  Has the patient had two or more falls in  the last year or any fall with injury in the last year?  No    Safety Assessment  Do you always wear your seatbelt?  Yes  Any changes to home needed to function safely? No  Difficulty hearing.  No  Patient counseled about all safety risks that were identified.    Functional Assessment ADLs  Are there any barriers preventing you from cooking for yourself or meeting nutritional needs?  No.    Are there any barriers preventing you from driving safely or obtaining transportation?  No.    Are there any barriers preventing you from using a telephone or calling for help?  No    Are there any barriers preventing you from shopping?  No.    Are there any barriers preventing you from taking care of your own finances?  No    Are there any barriers preventing you from managing your medications?  No    Are there any barriers preventing you from showering, bathing or dressing yourself? No    Are there any barriers preventing you from doing housework or laundry? No  Are there any barriers preventing you from using the toilet?No  Are you currently engaging in any exercise or physical activity?  Yes. Unloading trucks at work    Self-Assessment of Health  What is your perception of your health? Good    Do you sleep more than six hours a night? Yes    In the past 7 days, how much did pain keep you from doing your normal work? None    Do you spend quality time with family or friends (virtually or in person)? Yes    Do you usually eat a heart healthy diet that constists of a variety of fruits, vegetables, whole grains and fiber? Yes    Do you eat foods high in fat and/or Fast Food more than three times per week? No    How concerned are you that your medical conditions are not being well managed? Not at all    Are you worried that in the next 2 months, you may not have stable housing that you own, rent, or stay in as part of a household? No      Advance Care Planning  Do you have an Advance Directive, Living Will, Durable Power of  , or POLST? Yes          is not on file - instructed patient to bring in a copy to scan into their chart    History of Present Illness  The patient presents for evaluation of hyperlipidemia, hypertension, and dry skin.    DLD, elevated LDL and lipoprotein a      No current dermatologist; advised to follow up for skin cancer screening.    SOCIAL HISTORY  - Works at [employer] as a [employer] job    FAMILY HISTORY  - Mother had high cholesterol      Health Maintenance Summary            Current Care Gaps       COVID-19 Vaccine (7 - 2024-25 season) Overdue since 3/28/2025      09/28/2024  Imm Admin: COVID-19, mRNA, LNP-S, PF, lidia-sucrose, 30 mcg/0.3 mL    10/26/2023  Imm Admin: Covid-19 Mrna (Spikevax) Moderna 12+ Years    02/22/2023  Imm Admin: MODERNA BIVALENT BOOSTER SARS-COV-2 VACCINE (6+)    08/19/2022  Imm Admin: PFIZER MATOS CAP SARS-COV-2 VACCINATION (12+)    12/08/2021  Imm Admin: PFIZER PURPLE CAP SARS-COV-2 VACCINATION (12+)     Only the first 5 history entries have been loaded, but more history exists.            IMM DTaP/Tdap/Td Vaccine (1 - Tdap) Never done      No completion history exists for this topic.              Zoster (Shingles) Vaccines (1 of 2) Never done     No completion history exists for this topic.              Pneumococcal Vaccine: 50+ Years (1 of 1 - PCV) Never done     No completion history exists for this topic.                      Needs Review       Hepatitis C Screening  Tentatively Complete      01/21/2025  Hepatitis C Antibody component of HEP C VIRUS ANTIBODY              Mammogram (Yearly) Tentatively due on 1/28/2026 01/28/2025  MA-SCREENING MAMMO BILAT W/TOMOSYNTHESIS W/CAD              Colorectal Cancer Screening (Colon Cancer Screening Cologuard Stool (FIT DNA) - Every 3 Years) Tentatively due on 11/2/2027 11/02/2024  COLOGUARD RESULT component of LAB COLOGUARD® COLON CANCER SCREEN                      Upcoming       Influenza Vaccine (Season Ended) Next  "due on 9/1/2025     No completion history exists for this topic.              Annual Wellness Visit (Yearly) Next due on 6/3/2026      06/03/2025  Visit Dx: Medicare annual wellness visit, subsequent              Bone Density Scan (Every 5 Years) Next due on 3/7/2030      03/07/2025  DS-BONE DENSITY STUDY (DEXA)                      Completed or No Longer Recommended       Hepatitis A Vaccine (Hep A) (Series Information) Aged Out     No completion history exists for this topic.              Hepatitis B Vaccine (Hep B) (Series Information) Aged Out     No completion history exists for this topic.              HPV Vaccines (Series Information) Aged Out     No completion history exists for this topic.              Polio Vaccine (Inactivated Polio) (Series Information) Aged Out     No completion history exists for this topic.              Meningococcal Immunization (Series Information) Aged Out     No completion history exists for this topic.              Meningococcal B Vaccine (Series Information) Aged Out     No completion history exists for this topic.                            Patient Care Team:  Alize Rascon M.D. as PCP - General (Internal Medicine)        Social History[2]  Family History   Problem Relation Age of Onset   • No Known Problems Mother    • No Known Problems Father    • No Known Problems Sister      She  has no past medical history on file.   Past Surgical History[3]    Exam:   /70 (BP Location: Left arm, Patient Position: Sitting, BP Cuff Size: Adult)   Pulse 78   Temp 36.4 °C (97.6 °F) (Temporal)   Resp 16   Ht 1.575 m (5' 2\")   Wt 54.5 kg (120 lb 3.2 oz)   SpO2 98%  Body mass index is 21.98 kg/m².    Hearing satisfactory.    Dentition good  Alert, oriented in no acute distress.  Eye contact is good, speech goal directed, affect calm  Physical Exam  Constitutional:       General: She is not in acute distress.  HENT:      Head: Normocephalic.      Right Ear: External ear " normal.      Left Ear: External ear normal.      Nose: Nose normal.      Mouth/Throat:      Pharynx: No oropharyngeal exudate.   Eyes:      General: No scleral icterus.        Right eye: No discharge.         Left eye: No discharge.      Conjunctiva/sclera: Conjunctivae normal.      Pupils: Pupils are equal, round, and reactive to light.   Neck:      Thyroid: No thyromegaly.      Vascular: No JVD.   Cardiovascular:      Rate and Rhythm: Normal rate and regular rhythm.      Heart sounds: Normal heart sounds. No murmur heard.     No friction rub. No gallop.   Pulmonary:      Effort: Pulmonary effort is normal. No respiratory distress.      Breath sounds: Normal breath sounds. No wheezing or rales.   Abdominal:      General: There is no distension.      Palpations: Abdomen is soft.      Tenderness: There is no abdominal tenderness. There is no guarding or rebound.   Musculoskeletal:         General: No tenderness or deformity. Normal range of motion.      Cervical back: Normal range of motion and neck supple.   Lymphadenopathy:      Cervical: No cervical adenopathy.   Skin:     General: Skin is warm and dry.   Neurological:      Mental Status: She is alert and oriented to person, place, and time.      Gait: Gait is intact.   Psychiatric:         Mood and Affect: Mood and affect normal.         Cognition and Memory: Memory normal.         Judgment: Judgment normal.       Assessment and Plan. The following treatment and monitoring plan is recommended:    1. Medicare annual wellness visit, subsequent    2. Dyslipidemia  - rosuvastatin (CRESTOR) 20 MG Tab; Take 1 Tablet by mouth every evening.  Dispense: 100 Tablet; Refill: 3  - Lipid Profile; Future  - TSH WITH REFLEX TO FT4; Future  Healthful lifestyle measures  Benefit and risks of statin discussed with patient include side effect such as muscle toxicity, patient was told if having side effects from medication, to stop the medication and notify healthcare provider.   Patient is agreeable with healthful diet, exercise and initiation of statin.   Latest Reference Range & Units 05/27/25 08:32   Cholesterol,Tot 100 - 199 mg/dL 276 (H)   Triglycerides 0 - 149 mg/dL 138   HDL >=40 mg/dL 56   LDL <100 mg/dL 192 (H)   Lipoprotein (a) <=29 mg/dL 130 (H)   (H): Data is abnormally high    3. Elevated lipoprotein(a)  - rosuvastatin (CRESTOR) 20 MG Tab; Take 1 Tablet by mouth every evening.  Dispense: 100 Tablet; Refill: 3  - Lipid Profile; Future  Healthful lifestyle measures    4. Vitamin D insufficiency  - VITAMIN D,25 HYDROXY (DEFICIENCY); Future  Recommend vitamin D3 8740-5675 international units daily    5. Medication monitoring encounter  - Comp Metabolic Panel; Future    6. Abnormal CBC  - CBC WITH DIFFERENTIAL; Future      Services suggested: recommend patient to schedule with dermatology for skin cancer screening  Health Care Screening: Age-appropriate preventive services recommended by USPTF and ACIP covered by Medicare were discussed today. Services ordered if indicated and agreed upon by the patient.  Referrals offered: Community-based lifestyle interventions to reduce health risks and promote self-management and wellness, fall prevention, nutrition, physical activity, tobacco-use cessation, weight loss, and mental health services as per orders if indicated.    Discussion today about general wellness and lifestyle habits:    Prevent falls and reduce trip hazards; Cautioned about securing or removing rugs.  Have a working fire alarm and carbon monoxide detector;   Engage in regular physical activity and social activities     Follow-up: No follow-ups on file.           [1]  Current Outpatient Medications   Medication Sig Dispense Refill   • rosuvastatin (CRESTOR) 20 MG Tab Take 1 Tablet by mouth every evening. 100 Tablet 3   • bacitracin-polymyxin b (POLYSPORIN) [id number] UNIT/GM Ointment Apply 1 Each topically 2 times a day. 15 g 0     No current facility-administered medications  for this visit.   [2]  Social History  Tobacco Use   • Smoking status: Never   • Smokeless tobacco: Never   Vaping Use   • Vaping status: Never Used   Substance Use Topics   • Alcohol use: Not Currently     Comment: rarely   • Drug use: No   [3]  History reviewed. No pertinent surgical history.